# Patient Record
Sex: FEMALE
[De-identification: names, ages, dates, MRNs, and addresses within clinical notes are randomized per-mention and may not be internally consistent; named-entity substitution may affect disease eponyms.]

---

## 2024-10-02 ENCOUNTER — APPOINTMENT (OUTPATIENT)
Dept: ORTHOPEDIC SURGERY | Facility: CLINIC | Age: 49
End: 2024-10-02

## 2024-10-02 VITALS — BODY MASS INDEX: 29.44 KG/M2 | WEIGHT: 160 LBS | HEIGHT: 62 IN | RESPIRATION RATE: 16 BRPM

## 2024-10-02 DIAGNOSIS — Z78.9 OTHER SPECIFIED HEALTH STATUS: ICD-10-CM

## 2024-10-02 DIAGNOSIS — S67.01XD CRUSHING INJURY OF RIGHT THUMB, SUBSEQUENT ENCOUNTER: ICD-10-CM

## 2024-10-02 PROBLEM — Z00.00 ENCOUNTER FOR PREVENTIVE HEALTH EXAMINATION: Status: ACTIVE | Noted: 2024-10-02

## 2024-10-02 PROCEDURE — 20550 NJX 1 TENDON SHEATH/LIGAMENT: CPT | Mod: RT

## 2024-10-02 PROCEDURE — 99204 OFFICE O/P NEW MOD 45 MIN: CPT | Mod: 25

## 2024-10-02 RX ORDER — ERGOCALCIFEROL 1.25 MG/1
CAPSULE ORAL
Refills: 0 | Status: ACTIVE | COMMUNITY

## 2024-10-02 RX ORDER — ADALIMUMAB-ADAZ 40 MG/.4ML
40 INJECTION, SOLUTION SUBCUTANEOUS
Refills: 0 | Status: ACTIVE | COMMUNITY

## 2024-10-02 RX ORDER — LEFLUNOMIDE 20 MG/1
TABLET, FILM COATED ORAL
Refills: 0 | Status: ACTIVE | COMMUNITY

## 2024-10-02 RX ORDER — FOLIC ACID 20 MG
CAPSULE ORAL
Refills: 0 | Status: ACTIVE | COMMUNITY

## 2024-10-02 RX ORDER — METHOTREXATE 2.5 MG/1
TABLET ORAL
Refills: 0 | Status: ACTIVE | COMMUNITY

## 2024-10-02 NOTE — PHYSICAL EXAM
[de-identified] : Physical exam shows the patient to be alert and oriented x3, capable of ambulation. The patient is well-developed and well-nourished in no apparent respiratory distress. Majority of the skin is intact bilaterally in the upper extremities without lymphadenopathy at the elbows.  The wrists have a symmetric range of motion bilaterally. There is no tenderness over the scaphoid, scapholunate or lunotriquetral ligaments bilaterally. There is a negative Solis test bilaterally. There is negative tenderness over the radial ulnar joint or TFCC and no evidence of instability bilaterally. No tenderness over the pisotriquetral or hamate hook or CMC joint bilaterally. There is 5 over 5 strength of the wrists bilaterally.  There is swelling and tenderness localized the over the A1 pulley of the right thumb. There is a negative Finkelstein test and negative grind test bilaterally. There is no tenderness over the MP or IP joints and no evidence of instability. There is full range of motion of the MP or IP joint with active equally passive range of motion. No tenderness over the radial or ulnar sesamoids bilaterally. The FPL and extensor mechanism are intact with 5 over 5 strength bilaterally.  IP joint is locked at40 degrees of flexion which is not passively correctable but the extensor tendon is functioning.    There is good capillary refill of the digits bilaterally.There is no masses or sensitivity over the median and ulnar nerves at the level of the wrist. There is a negative Tinel's and negative Phalen's sign bilaterally. The sensation is grossly intact bilaterally. [de-identified] : PA lateral and oblique of the right thumb shows no evidence of fractures or dislocations with joint spaces well-preserved and no evidence of soft tissue calcifications.

## 2024-10-02 NOTE — CONSULT LETTER
[Dear  ___] : Dear  [unfilled], [Consult Letter:] : I had the pleasure of evaluating your patient, [unfilled]. [Please see my note below.] : Please see my note below. [Consult Closing:] : Thank you very much for allowing me to participate in the care of this patient.  If you have any questions, please do not hesitate to contact me. [Sincerely,] : Sincerely, [FreeTextEntry3] : Tk Echevarria M.D., FAAOS Co-Director [facility]

## 2024-10-02 NOTE — ASSESSMENT
[FreeTextEntry1] : 3-1/2 weeks status post a right trigger thumb which appears to be left which started shortly after trauma while performing her activities related to her profession at the [employer].  The risks, benefits, alternatives and associated differential diagnosis was discussed with the patient. Options ranged from conservative care, therapy to surgical intervention were reviewed and all questions answered. Risks included incomplete resolution of symptoms, worsening of symptoms recurrence, tissue loss, functional loss and other risks associated with treatment of this condition Patient appeared to have an excellent understanding of the risks as well as differential diagnosis associated with this condition.  She elected to proceed with an injection of the right thumb flexor tendon sheath.  After the area was cleaned with alcohol to reduce the risk of infection, a half a cc of Kenalog 10-1/2 a cc of 2% lidocaine was placed into the region. Hemostasis was obtained by direct pressure and a Band-Aid applied.  Right thumb tendon sheath.  Home program was elected over formal therapy.  She is cleared to return to work tomorrow without restrictions.  Return to the office in 6 weeks if symptoms not fully resolved otherwise on an as-needed basis.

## 2024-10-02 NOTE — HISTORY OF PRESENT ILLNESS
[Yes] : The patient is currently working. [Resumed full work: ______] : The patient resumed full work on [unfilled]. [Right] : right hand dominant [FreeTextEntry1] : Patient presents today in regard to crush injury of the right thumb sustained from closure of a door on her right thumb. She was treated at [facility] where she got x-rays showing no evidence of fracture or acute bony abnormality. She was provided with a finger splint. She reports that she's not able to straighten her right thumb.  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

## 2024-12-05 ENCOUNTER — APPOINTMENT (OUTPATIENT)
Dept: ORTHOPEDIC SURGERY | Facility: CLINIC | Age: 49
End: 2024-12-05
Payer: OTHER MISCELLANEOUS

## 2024-12-05 VITALS — RESPIRATION RATE: 16 BRPM | BODY MASS INDEX: 29.44 KG/M2 | HEIGHT: 62 IN | WEIGHT: 160 LBS

## 2024-12-05 DIAGNOSIS — S67.01XD CRUSHING INJURY OF RIGHT THUMB, SUBSEQUENT ENCOUNTER: ICD-10-CM

## 2024-12-05 PROCEDURE — 99214 OFFICE O/P EST MOD 30 MIN: CPT

## 2025-01-16 ENCOUNTER — APPOINTMENT (OUTPATIENT)
Dept: ORTHOPEDIC SURGERY | Facility: CLINIC | Age: 50
End: 2025-01-16
Payer: OTHER MISCELLANEOUS

## 2025-01-16 DIAGNOSIS — S67.01XD CRUSHING INJURY OF RIGHT THUMB, SUBSEQUENT ENCOUNTER: ICD-10-CM

## 2025-01-16 PROCEDURE — 99214 OFFICE O/P EST MOD 30 MIN: CPT

## 2025-02-27 ENCOUNTER — APPOINTMENT (OUTPATIENT)
Dept: ORTHOPEDIC SURGERY | Facility: CLINIC | Age: 50
End: 2025-02-27

## 2025-02-27 VITALS — HEIGHT: 62 IN | RESPIRATION RATE: 16 BRPM | WEIGHT: 160 LBS | BODY MASS INDEX: 29.44 KG/M2

## 2025-02-27 DIAGNOSIS — S67.01XD CRUSHING INJURY OF RIGHT THUMB, SUBSEQUENT ENCOUNTER: ICD-10-CM

## 2025-02-27 PROCEDURE — 20550 NJX 1 TENDON SHEATH/LIGAMENT: CPT | Mod: RT

## 2025-02-27 PROCEDURE — 99214 OFFICE O/P EST MOD 30 MIN: CPT | Mod: 25

## 2025-04-10 ENCOUNTER — APPOINTMENT (OUTPATIENT)
Dept: ORTHOPEDIC SURGERY | Facility: CLINIC | Age: 50
End: 2025-04-10